# Patient Record
Sex: FEMALE | Race: WHITE | Employment: FULL TIME | URBAN - METROPOLITAN AREA
[De-identification: names, ages, dates, MRNs, and addresses within clinical notes are randomized per-mention and may not be internally consistent; named-entity substitution may affect disease eponyms.]

---

## 2018-02-16 ENCOUNTER — INITIAL CONSULT (OUTPATIENT)
Dept: PULMONOLOGY | Age: 58
End: 2018-02-16

## 2018-02-16 VITALS
BODY MASS INDEX: 31.35 KG/M2 | OXYGEN SATURATION: 97 % | RESPIRATION RATE: 18 BRPM | SYSTOLIC BLOOD PRESSURE: 122 MMHG | HEIGHT: 70 IN | DIASTOLIC BLOOD PRESSURE: 80 MMHG | WEIGHT: 219 LBS | HEART RATE: 80 BPM

## 2018-02-16 DIAGNOSIS — R05.3 CHRONIC COUGH: Primary | ICD-10-CM

## 2018-02-16 DIAGNOSIS — G47.19 EXCESSIVE DAYTIME SLEEPINESS: ICD-10-CM

## 2018-02-16 DIAGNOSIS — J45.991 COUGH VARIANT ASTHMA: ICD-10-CM

## 2018-02-16 DIAGNOSIS — R06.83 SNORING: ICD-10-CM

## 2018-02-16 LAB
DLCO %PRED: NORMAL
DLCO PRE: NORMAL
FEF 25-75%-POST: 3.18
FEF 25-75%-PRE: 4.39
FEV1-POST: 2.48
FEV1-PRE: 2.45
FEV1/FVC-POST: 90.7
FEV1/FVC-PRE: 90
FVC-POST: 2.74
FVC-PRE: 2.72
MEP: NORMAL
MIP: NORMAL
TLC %PRED: NORMAL
TLC PRE: NORMAL

## 2018-02-16 PROCEDURE — 99204 OFFICE O/P NEW MOD 45 MIN: CPT | Performed by: INTERNAL MEDICINE

## 2018-02-16 RX ORDER — OMEPRAZOLE 40 MG/1
40 CAPSULE, DELAYED RELEASE ORAL DAILY
COMMUNITY

## 2018-02-16 RX ORDER — TRAZODONE HYDROCHLORIDE 100 MG/1
100 TABLET ORAL NIGHTLY
COMMUNITY

## 2018-02-16 RX ORDER — ALBUTEROL SULFATE 90 UG/1
2 AEROSOL, METERED RESPIRATORY (INHALATION) EVERY 6 HOURS PRN
COMMUNITY
End: 2021-10-18 | Stop reason: SDUPTHER

## 2018-02-16 RX ORDER — MONTELUKAST SODIUM 10 MG/1
10 TABLET ORAL NIGHTLY
COMMUNITY

## 2018-02-16 RX ORDER — CHOLESTYRAMINE
POWDER (GRAM) MISCELLANEOUS
COMMUNITY

## 2018-02-16 ASSESSMENT — PULMONARY FUNCTION TESTS
FEV1/FVC_POST: 90.7
FEV1_POST: 2.48
FVC_POST: 2.74
FEV1_PRE: 2.45
FEV1/FVC_PRE: 90.0
FVC_PRE: 2.72

## 2018-02-16 NOTE — PROGRESS NOTES
Subjective:   Chief complaintchronic cough  Nigel Lawrence is a 73-year-old female states that she's had a chronic cough for over 5 years. The etiology is not been found but she states that treatment with an albuterol rescue inhaler has been helpful. She coughs day and night and at times her cough is productive of a small amount of sputum. In the late fall she coughed so hard that she had severe pain in her right lateral chest wall which was later confirmed by CT chest showing healed rib fracture in the fourth and fifth right rib. She has never been diagnosed with chronic lung disease in the past and denies a history of asthma as a child or adolescent and there is no family history of asthma or COPD. She is always been a nonsmoker. She states that it at least 5 years ago she was diagnosed with airborne allergen sensitivity and was sensitive to trees, grasses, dust, molds, cat and dog dander. She did receive immunotherapy for several years. She does have a history of GERD and takes omeprazole on a daily basis but states that she has no breakthrough symptoms. She does not have a strong history for seasonal or perennial rhinitis. She also does not have a history of hypertension or exposure to ACE inhibitors  She does state that over the past year she's noted increasing shortness of breath with some chest congestion. At times she will hear herself wheeze. She has not been on controlling inhaled medications or inhaled steroids. He does take Singulair on a daily basis. She also states that over the past 5-10 years she is had very heavy snoring, awakens at nighttime starting gasping for air and does complain of excessive daytime sleepiness.   She is never undergone a workup for obstructive sleep apnea  Past Medical History:  Past Medical History:   Diagnosis Date    Chronic cough 2/16/2018    Cough variant asthma 2/16/2018    Excessive daytime sleepiness 2/16/2018    Snoring 2/16/2018       Current Medications:

## 2018-02-19 DIAGNOSIS — J45.991 COUGH VARIANT ASTHMA: ICD-10-CM

## 2018-02-19 DIAGNOSIS — R05.3 CHRONIC COUGH: ICD-10-CM

## 2019-11-12 ENCOUNTER — OFFICE VISIT (OUTPATIENT)
Dept: PHYSICAL MEDICINE AND REHAB | Age: 59
End: 2019-11-12
Payer: COMMERCIAL

## 2019-11-12 DIAGNOSIS — M79.601 PARESTHESIA AND PAIN OF BOTH UPPER EXTREMITIES: ICD-10-CM

## 2019-11-12 DIAGNOSIS — G56.02 CARPAL TUNNEL SYNDROME OF LEFT WRIST: Primary | ICD-10-CM

## 2019-11-12 DIAGNOSIS — M79.602 PARESTHESIA AND PAIN OF BOTH UPPER EXTREMITIES: ICD-10-CM

## 2019-11-12 DIAGNOSIS — M25.522 ELBOW PAIN, CHRONIC, LEFT: ICD-10-CM

## 2019-11-12 DIAGNOSIS — R20.2 PARESTHESIA AND PAIN OF BOTH UPPER EXTREMITIES: ICD-10-CM

## 2019-11-12 DIAGNOSIS — G89.29 ELBOW PAIN, CHRONIC, LEFT: ICD-10-CM

## 2019-11-12 PROCEDURE — 95911 NRV CNDJ TEST 9-10 STUDIES: CPT | Performed by: PHYSICAL MEDICINE & REHABILITATION

## 2019-11-12 PROCEDURE — 95885 MUSC TST DONE W/NERV TST LIM: CPT | Performed by: PHYSICAL MEDICINE & REHABILITATION

## 2021-01-30 ENCOUNTER — HOSPITAL ENCOUNTER (OUTPATIENT)
Dept: GENERAL RADIOLOGY | Age: 61
Discharge: HOME | End: 2021-01-30
Payer: COMMERCIAL

## 2021-01-30 ENCOUNTER — HOSPITAL ENCOUNTER (OUTPATIENT)
Age: 61
Discharge: HOME | End: 2021-01-30
Payer: COMMERCIAL

## 2021-01-30 DIAGNOSIS — R06.2 WHEEZING: ICD-10-CM

## 2021-01-30 DIAGNOSIS — R05.9 COUGH: ICD-10-CM

## 2021-01-30 PROCEDURE — 71046 X-RAY EXAM CHEST 2 VIEWS: CPT

## 2021-03-15 ENCOUNTER — HOSPITAL ENCOUNTER (OUTPATIENT)
Dept: CT IMAGING | Age: 61
Discharge: HOME | End: 2021-03-15
Payer: COMMERCIAL

## 2021-03-15 DIAGNOSIS — K57.81 DIVERTICULITIS OF INTESTINE, PART UNSPECIFIED, WITH PERFORATION AND ABSCESS WITH BLEEDING: ICD-10-CM

## 2021-03-15 DIAGNOSIS — R10.32 LLQ ABDOMINAL PAIN: ICD-10-CM

## 2021-03-15 LAB
GFR AFRICAN AMERICAN: >60 ML/MIN/1.73M2
GFR NON-AFRICAN AMERICAN: >60 ML/MIN/1.73M2
POC CREATININE: 0.7 MG/DL (ref 0.6–1.1)

## 2021-03-15 PROCEDURE — 2580000003 HC RX 258: Performed by: INTERNAL MEDICINE

## 2021-03-15 PROCEDURE — 6360000004 HC RX CONTRAST MEDICATION: Performed by: INTERNAL MEDICINE

## 2021-03-15 PROCEDURE — 74177 CT ABD & PELVIS W/CONTRAST: CPT

## 2021-03-15 RX ORDER — SODIUM CHLORIDE 0.9 % (FLUSH) 0.9 %
10 SYRINGE (ML) INJECTION PRN
Status: DISCONTINUED | OUTPATIENT
Start: 2021-03-15 | End: 2021-03-16 | Stop reason: HOSPADM

## 2021-03-15 RX ADMIN — SODIUM CHLORIDE, PRESERVATIVE FREE 10 ML: 5 INJECTION INTRAVENOUS at 14:15

## 2021-03-15 RX ADMIN — IOPAMIDOL 75 ML: 755 INJECTION, SOLUTION INTRAVENOUS at 14:15

## 2021-03-15 RX ADMIN — IOHEXOL 50 ML: 240 INJECTION, SOLUTION INTRATHECAL; INTRAVASCULAR; INTRAVENOUS; ORAL at 13:10

## 2021-03-25 ENCOUNTER — INITIAL CONSULT (OUTPATIENT)
Dept: PULMONOLOGY | Age: 61
End: 2021-03-25
Payer: COMMERCIAL

## 2021-03-25 VITALS
HEIGHT: 70 IN | DIASTOLIC BLOOD PRESSURE: 74 MMHG | SYSTOLIC BLOOD PRESSURE: 120 MMHG | OXYGEN SATURATION: 96 % | BODY MASS INDEX: 30.78 KG/M2 | WEIGHT: 215 LBS | HEART RATE: 89 BPM

## 2021-03-25 DIAGNOSIS — J45.30 MILD PERSISTENT ASTHMA WITHOUT COMPLICATION: Primary | ICD-10-CM

## 2021-03-25 DIAGNOSIS — J45.991 COUGH VARIANT ASTHMA: ICD-10-CM

## 2021-03-25 DIAGNOSIS — R06.83 SNORING: ICD-10-CM

## 2021-03-25 PROCEDURE — 99213 OFFICE O/P EST LOW 20 MIN: CPT | Performed by: INTERNAL MEDICINE

## 2021-03-25 PROCEDURE — 3017F COLORECTAL CA SCREEN DOC REV: CPT | Performed by: INTERNAL MEDICINE

## 2021-03-25 PROCEDURE — 1036F TOBACCO NON-USER: CPT | Performed by: INTERNAL MEDICINE

## 2021-03-25 PROCEDURE — G8484 FLU IMMUNIZE NO ADMIN: HCPCS | Performed by: INTERNAL MEDICINE

## 2021-03-25 PROCEDURE — G8417 CALC BMI ABV UP PARAM F/U: HCPCS | Performed by: INTERNAL MEDICINE

## 2021-03-25 PROCEDURE — G8427 DOCREV CUR MEDS BY ELIG CLIN: HCPCS | Performed by: INTERNAL MEDICINE

## 2021-03-25 RX ORDER — FLUTICASONE FUROATE AND VILANTEROL TRIFENATATE 100; 25 UG/1; UG/1
1 POWDER RESPIRATORY (INHALATION) DAILY
Qty: 1 EACH | Refills: 11 | Status: SHIPPED | OUTPATIENT
Start: 2021-03-25

## 2021-03-25 NOTE — PROGRESS NOTES
basilar atelectasis. Chest x-ray on 1/30/2021 showed no acute cardiopulmonary abnormality  PFT: Office spirometry on 2/16/2018 demonstrated no significant airways obstruction and no significant response to bronchodilators      Echocardiogram: No echo available    ASSESSMENT:    1. Mild persistent asthma without complication    2. Cough variant asthma    3. Snoring          PLAN:   I do believe she has mild persistent asthmastage II. I would like to start her on Breo 100/25 1 inhalation daily and allow her to use her Singulair and albuterol rescue as needed. I would like to repeat her pulmonary function test in 1 month. I did encourage her to get the COVID-19 vaccination as soon as possible. I will continue to follow her    We have discussed the need to maintain yearly flu immunization, pneumococcal vaccination. We have discussed Coronavirus precaution including handwashing practice, wiping items touched in public such as gas pumps, door handles, shopping carts, etc. Self monitoring for infection - fever, chills, cough, SOB. Should they develop symptoms they should call office for further instructions. Return in about 4 weeks (around 4/22/2021) for Recheck for Asthma, Recheck for Cough, PFT testing. This dictation was performed with a verbal recognition program and it was checked for errors. It is possible that there are still dictated errors within this office note. Any errors should be brought immediately to my attention for correction. All efforts were made to ensure that this office note is accurate.

## 2021-04-22 ENCOUNTER — PROCEDURE VISIT (OUTPATIENT)
Dept: PULMONOLOGY | Age: 61
End: 2021-04-22
Payer: COMMERCIAL

## 2021-04-22 DIAGNOSIS — J45.991 COUGH VARIANT ASTHMA: ICD-10-CM

## 2021-04-22 DIAGNOSIS — J45.30 MILD PERSISTENT ASTHMA WITHOUT COMPLICATION: Primary | ICD-10-CM

## 2021-04-22 DIAGNOSIS — J45.30 MILD PERSISTENT ASTHMA WITHOUT COMPLICATION: ICD-10-CM

## 2021-04-22 LAB
EXPIRATORY TIME-POST: NORMAL
EXPIRATORY TIME: NORMAL
FEF 25-75% %CHNG: NORMAL
FEF 25-75% %PRED-POST: NORMAL
FEF 25-75% %PRED-PRE: NORMAL
FEF 25-75% PRED: NORMAL
FEF 25-75%-POST: NORMAL
FEF 25-75%-PRE: NORMAL
FEV1 %PRED-POST: 72.8 %
FEV1 %PRED-PRE: 74.7 %
FEV1 PRED: 3.2 L
FEV1-POST: 2.33 L
FEV1-PRE: 2.39 L
FEV1/FVC %PRED-POST: 110.7 %
FEV1/FVC %PRED-PRE: 113.1 %
FEV1/FVC PRED: 78.4 %
FEV1/FVC-POST: 86.8 %
FEV1/FVC-PRE: 88.7 %
FVC %PRED-POST: 65.2 L
FVC %PRED-PRE: 65.4 %
FVC PRED: 4.13 L
FVC-POST: 2.69 L
FVC-PRE: 2.7 L
PEF %PRED-POST: NORMAL
PEF %PRED-PRE: NORMAL
PEF PRED: NORMAL
PEF%CHNG: NORMAL
PEF-POST: NORMAL
PEF-PRE: NORMAL

## 2021-04-22 PROCEDURE — G8417 CALC BMI ABV UP PARAM F/U: HCPCS | Performed by: INTERNAL MEDICINE

## 2021-04-22 PROCEDURE — 1036F TOBACCO NON-USER: CPT | Performed by: INTERNAL MEDICINE

## 2021-04-22 PROCEDURE — G8427 DOCREV CUR MEDS BY ELIG CLIN: HCPCS | Performed by: INTERNAL MEDICINE

## 2021-04-22 PROCEDURE — 3017F COLORECTAL CA SCREEN DOC REV: CPT | Performed by: INTERNAL MEDICINE

## 2021-04-22 PROCEDURE — 99213 OFFICE O/P EST LOW 20 MIN: CPT | Performed by: INTERNAL MEDICINE

## 2021-04-22 ASSESSMENT — PULMONARY FUNCTION TESTS
FEV1_PERCENT_PREDICTED_POST: 72.8
FEV1/FVC_PERCENT_PREDICTED_POST: 110.7
FEV1/FVC_POST: 86.8
FEV1_POST: 2.33
FEV1_PREDICTED: 3.20
FEV1_PRE: 2.39
FEV1/FVC_PRE: 88.7
FVC_POST: 2.69
FVC_PERCENT_PREDICTED_POST: 65.2
FVC_PERCENT_PREDICTED_PRE: 65.4
FEV1/FVC_PREDICTED: 78.4
FVC_PRE: 2.70
FEV1/FVC_PERCENT_PREDICTED_PRE: 113.1
FEV1_PERCENT_PREDICTED_PRE: 74.7
FVC_PREDICTED: 4.13

## 2021-04-22 NOTE — PROGRESS NOTES
CHIEF COMPLIANT:  Ely Pryor presents to the pulmonary clinic today for evaluation, spirometry testing, review of testing results and pulmonary medications. She major complaint today is mild persistent asthma, cough variant asthma    HPI: Jim Hicks states that since being started back on the Breo her cough is improved and she is not having any major asthmatic symptoms. She denies worsening shortness of breath. She does continue on Breo, Singulair and albuterol rescue. She has had no known COVID-19 exposure or infection and has received the [employer] COVID-19 vaccination    Physical Exam:  Constitutional:  She appears well developed and well-nourished. Respiratory: No acute respiratory distress noted  Neurologic: Oriented x3, normal speech    OFFICE SPIROMETRY:  Ely Pryor demonstrates an FEV1 of 2.39 L with an FVC of 2.70 liters. She demonstrates no signal obstructive lung defect. She shows no signal response to bronchodilators. Overall, her lung function has remained stable over the 3 past years. ASSESSMENT:    1. Mild persistent asthma without complication    2. Cough variant asthma          PLAN:   I will make no change in her current bronchodilator therapy. I did encourage use her albuterol rescue inhaler if she develops any intermittent wheezing or worsening cough. I will continue to follow her      We have discussed the need to maintain yearly flu immunization, pneumococcal vaccination and coronavirus vaccination. We have discussed Coronavirus precaution including handwashing practice, wiping items touched in public such as gas pumps, door handles, shopping carts, etc. Self monitoring for infection - fever, chills, cough, SOB. Should they develop symptoms they should call office for further instructions. Return in about 6 months (around 10/22/2021) for Recheck for Asthma, Recheck for Cough.       This dictation was performed with a verbal recognition program and it was checked for errors. It is possible that there are still dictated errors within this office note. Any errors should be brought immediately to my attention for correction. All efforts were made to ensure that this office note is accurate.

## 2021-10-18 ENCOUNTER — OFFICE VISIT (OUTPATIENT)
Dept: PULMONOLOGY | Age: 61
End: 2021-10-18
Payer: COMMERCIAL

## 2021-10-18 VITALS
SYSTOLIC BLOOD PRESSURE: 106 MMHG | HEART RATE: 74 BPM | HEIGHT: 70 IN | DIASTOLIC BLOOD PRESSURE: 62 MMHG | BODY MASS INDEX: 30.06 KG/M2 | OXYGEN SATURATION: 96 % | WEIGHT: 210 LBS

## 2021-10-18 DIAGNOSIS — R05.3 CHRONIC COUGH: ICD-10-CM

## 2021-10-18 DIAGNOSIS — J45.30 MILD PERSISTENT ASTHMA WITHOUT COMPLICATION: Primary | ICD-10-CM

## 2021-10-18 PROCEDURE — G8417 CALC BMI ABV UP PARAM F/U: HCPCS | Performed by: INTERNAL MEDICINE

## 2021-10-18 PROCEDURE — G8484 FLU IMMUNIZE NO ADMIN: HCPCS | Performed by: INTERNAL MEDICINE

## 2021-10-18 PROCEDURE — 1036F TOBACCO NON-USER: CPT | Performed by: INTERNAL MEDICINE

## 2021-10-18 PROCEDURE — 3017F COLORECTAL CA SCREEN DOC REV: CPT | Performed by: INTERNAL MEDICINE

## 2021-10-18 PROCEDURE — G8427 DOCREV CUR MEDS BY ELIG CLIN: HCPCS | Performed by: INTERNAL MEDICINE

## 2021-10-18 PROCEDURE — 99213 OFFICE O/P EST LOW 20 MIN: CPT | Performed by: INTERNAL MEDICINE

## 2021-10-18 RX ORDER — ALBUTEROL SULFATE 90 UG/1
2 AEROSOL, METERED RESPIRATORY (INHALATION) EVERY 6 HOURS PRN
Qty: 1 EACH | Refills: 11 | Status: SHIPPED | OUTPATIENT
Start: 2021-10-18

## 2021-10-18 NOTE — PROGRESS NOTES
SUBJECTIVE:  Chief Complaint: Mild persistent asthma, history of cough variant asthma with chronic cough  Tommy Christianson states that she had one episode of asthmatic bronchitis late in the summer and did go to urgent care and was given antibiotics and oral steroids and did improve and recovered from that. She continues on Breo daily and Singulair along with albuterol rescue. She denies any chest pain or chest discomfort. Soon after seeing her in March 2021 she did get the [employer] COVID-19 vaccination but has not received a booster yet. She mentions that she recently got the influenza vaccine  She states that she periodically gets hoarseness and did have some associated with her bronchitis this summer but has not had it recently. I am not aware of her having thrush    ROS:  Constitution:  HEENT: Negative for ear, throat pain  Cardiovascular: Negative for chest pain, syncope, edema  Pulmonary: See HPI  Musculoskeletal: Negative for DVT, myalgias, arthralgias    OBJECTIVE:  /62   Pulse 74   Ht 5' 10\" (1.778 m)   Wt 210 lb (95.3 kg)   SpO2 96%   BMI 30.13 kg/m²      Physical Exam:  Constitutional:  She appears well developed and well-nourished. Neck:  Supple, No palpable lymphadenopathy, No JVD  Cardiovascular:  S1, S2 Normal, Regular rhythm, no murmurs or gallops, No pericardial  rubs. Pulmonary: Clear breath sounds throughout all lung areas without wheezing or rhonchi  Abdomen: Not examined  Extremities: no edema, No DVT  Neurologic: Oriented x3, No focal deficits    Radiology: Chest x-ray on 1/30/2021 showed no acute cardiopulmonary abnormality  PFT: Office spirometry on 4/22/2021 demonstrated no significant airways obstruction and no significant response to bronchodilators and overall her lung function had remained stable over the previous 3 years          ASSESSMENT:    1. Mild persistent asthma without complication    2.  Chronic cough          PLAN:   I will make no change in her current bronchodilator therapy and she seems to be tolerating Breo quite well. I did renew her Ventolin rescue inhaler order. I did recommend she get the booster COVID-19 vaccination when it becomes available to her. I will continue to follow her    We have discussed the need to maintain yearly flu immunization, pneumococcal vaccination. We have discussed Coronavirus precaution including handwashing practice, wiping items touched in public such as gas pumps, door handles, shopping carts, etc. Self monitoring for infection - fever, chills, cough, SOB. Should they develop symptoms they should call office for further instructions. Return in about 6 months (around 4/18/2022) for Recheck for Asthma, Recheck for Cough. This dictation was performed with a verbal recognition program and it was checked for errors. It is possible that there are still dictated errors within this office note. Any errors should be brought immediately to my attention for correction. All efforts were made to ensure that this office note is accurate.

## 2022-05-18 ENCOUNTER — TELEPHONE (OUTPATIENT)
Dept: GASTROENTEROLOGY | Age: 62
End: 2022-05-18

## 2022-06-10 ENCOUNTER — TELEPHONE (OUTPATIENT)
Dept: GASTROENTEROLOGY | Age: 62
End: 2022-06-10

## 2022-06-13 ENCOUNTER — TELEPHONE (OUTPATIENT)
Dept: GASTROENTEROLOGY | Age: 62
End: 2022-06-13

## 2022-06-13 NOTE — TELEPHONE ENCOUNTER
[id number]  [birth year] mother of patient called to cancel daughter's procedure 7/7/22. Patient has to work that day and needs to cancel. Patient will call back to reschedule in December.   Sent to Rima Regalado in [place]

## 2022-06-23 ENCOUNTER — TELEPHONE (OUTPATIENT)
Dept: GASTROENTEROLOGY | Age: 62
End: 2022-06-23

## 2022-08-30 ENCOUNTER — TELEPHONE (OUTPATIENT)
Dept: GASTROENTEROLOGY | Age: 62
End: 2022-08-30

## 2022-11-07 ENCOUNTER — PREP FOR PROCEDURE (OUTPATIENT)
Dept: GASTROENTEROLOGY | Age: 62
End: 2022-11-07

## 2022-11-07 RX ORDER — SODIUM CHLORIDE 9 MG/ML
25 INJECTION, SOLUTION INTRAVENOUS PRN
Status: CANCELLED | OUTPATIENT
Start: 2022-11-07

## 2022-11-07 RX ORDER — SODIUM CHLORIDE, SODIUM LACTATE, POTASSIUM CHLORIDE, CALCIUM CHLORIDE 600; 310; 30; 20 MG/100ML; MG/100ML; MG/100ML; MG/100ML
INJECTION, SOLUTION INTRAVENOUS CONTINUOUS
Status: CANCELLED | OUTPATIENT
Start: 2022-11-07

## 2022-11-07 RX ORDER — SODIUM CHLORIDE 0.9 % (FLUSH) 0.9 %
5-40 SYRINGE (ML) INJECTION PRN
Status: CANCELLED | OUTPATIENT
Start: 2022-11-07

## 2022-11-07 RX ORDER — SODIUM CHLORIDE 0.9 % (FLUSH) 0.9 %
5-40 SYRINGE (ML) INJECTION EVERY 12 HOURS SCHEDULED
Status: CANCELLED | OUTPATIENT
Start: 2022-11-07

## 2022-11-30 ENCOUNTER — TELEPHONE (OUTPATIENT)
Dept: GASTROENTEROLOGY | Age: 62
End: 2022-11-30

## 2022-11-30 NOTE — TELEPHONE ENCOUNTER
Per my call to "[employer] [address] at [facility] DOUG-C-scope approved to 3/27/23.  Auth# [id number]

## 2022-12-02 ENCOUNTER — TELEPHONE (OUTPATIENT)
Dept: GASTROENTEROLOGY | Age: 62
End: 2022-12-02

## 2022-12-02 NOTE — TELEPHONE ENCOUNTER
Per my call to Cruz Woo at [facility]; C-scope is approved. Auth# [id number]. Call # [phone].

## 2022-12-27 RX ORDER — MEDROXYPROGESTERONE ACETATE 2.5 MG/1
2.5 TABLET ORAL DAILY
COMMUNITY

## 2022-12-27 NOTE — PROGRESS NOTES
Patient will be called with an arrival time on 12/29/2022 for her procedure at [facility] on 12/30/2022. 1. Do not eat or drink anything after 12 midnight (or____hours) unless instructed by your doctor prior to surgery. This includes no water, chewing gum or mints. NO chewing tobacco.  2. Follow your directions as prescribed by the doctor for your procedure and medications. 3.Check with your Doctor regarding stopping Plavix, Coumadin, Lovenox,Effient,Pradaxa,Xarelto, Fragmin or other blood thinners and follow their instructions. 4. Do not smoke, and do not drink any alcoholic beverages 24 hours prior to surgery. This includes NA Beer. 5. You may brush your teeth and gargle the morning of surgery. DO NOT SWALLOW WATER  6. You MUST make arrangements for a responsible adult to take you home after your surgery and be able to check on you every couple hours for the day. You will not be allowed     to leave alone or drive yourself home. It is strongly suggested someone stay with you the first 24 hrs. Your surgery will be cancelled if you do not have a ride home. 7. Please wear simple, loose fitting clothing to the hospital.  Belinda Mccormick not bring valuables (money, credit cards, checkbooks, etc.) Do not wear any makeup (including no eye makeup) or nail polish on your fingers or toes. 8. DO NOT wear any jewelry or piercings on day of surgery. All body piercing jewelry must be removed  9. If you have dentures, they will be removed before going to the OR; we will provide you a container. If you wear contact lenses or glasses, they will be removed; please bring a case for them. 10. If you  have a Living Will and Durable Power of  for Healthcare, please bring in a copy. 11 Please bring picture ID,  insurance card, paperwork from the doctors office    (H & P, Consent, & card for implantable devices). Patient will use her ellipta inhaler the morning of her procedure and take her prilosec, she will bring her rescue inhaler with her that day. She had no questions concerning colon prep instructions at this time.

## 2022-12-28 ENCOUNTER — HOSPITAL ENCOUNTER (OUTPATIENT)
Dept: NUCLEAR MEDICINE | Age: 62
Discharge: HOME | End: 2022-12-28
Payer: COMMERCIAL

## 2022-12-28 DIAGNOSIS — Z96.652 PRESENCE OF LEFT ARTIFICIAL KNEE JOINT: ICD-10-CM

## 2022-12-28 DIAGNOSIS — M25.562 LEFT KNEE PAIN, UNSPECIFIED CHRONICITY: ICD-10-CM

## 2022-12-28 PROCEDURE — A9503 TC99M MEDRONATE: HCPCS | Performed by: ORTHOPAEDIC SURGERY

## 2022-12-28 PROCEDURE — 3430000000 HC RX DIAGNOSTIC RADIOPHARMACEUTICAL: Performed by: ORTHOPAEDIC SURGERY

## 2022-12-28 PROCEDURE — 78315 BONE IMAGING 3 PHASE: CPT

## 2022-12-28 RX ORDER — TC 99M MEDRONATE 20 MG/10ML
27.4 INJECTION, POWDER, LYOPHILIZED, FOR SOLUTION INTRAVENOUS
Status: COMPLETED | OUTPATIENT
Start: 2022-12-28 | End: 2022-12-28

## 2022-12-28 RX ADMIN — TC 99M MEDRONATE 27.4 MILLICURIE: 20 INJECTION, POWDER, LYOPHILIZED, FOR SOLUTION INTRAVENOUS at 08:29

## 2022-12-29 ENCOUNTER — ANESTHESIA EVENT (OUTPATIENT)
Dept: OPERATING ROOM | Age: 62
End: 2022-12-29
Payer: COMMERCIAL

## 2022-12-29 NOTE — ANESTHESIA PRE PROCEDURE
Department of Anesthesiology  Preprocedure Note       Name:  Berta Palafox   Age:  63 y.o.  :  1960                                          MRN:  [id number]         Date:  2022      Surgeon: Ana Luisa Harris):  Melani Landrum MD    Procedure: Procedure(s):  COLORECTAL CANCER SCREENING, NOT HIGH RISK    Medications prior to admission:   Prior to Admission medications    Medication Sig Start Date End Date Taking? Authorizing Provider   medroxyPROGESTERone (PROVERA) 2.5 MG tablet Take 2.5 mg by mouth daily   Yes Historical Provider, MD   albuterol sulfate HFA (VENTOLIN HFA) 108 (90 Base) MCG/ACT inhaler Inhale 2 puffs into the lungs every 6 hours as needed for Wheezing 10/18/21   Edison Chong MD   fluticasone-vilanterol (BREO ELLIPTA) 100-25 MCG/INH AEPB inhaler Inhale 1 puff into the lungs daily After inhalation then gargle 3/25/21   Edison Chong MD   Cholestyramine POWD by Does not apply route    Historical Provider, MD   montelukast (SINGULAIR) 10 MG tablet Take 10 mg by mouth nightly    Historical Provider, MD   traZODone (DESYREL) 100 MG tablet Take 100 mg by mouth nightly    Historical Provider, MD   omeprazole (PRILOSEC) 40 MG delayed release capsule Take 40 mg by mouth daily    Historical Provider, MD   sertraline (ZOLOFT) 100 MG tablet Take 100 mg by mouth daily. Historical Provider, MD   estradiol (ESTRACE) 1 MG tablet Take 1 mg by mouth daily. Historical Provider, MD   simvastatin (ZOCOR) 40 MG tablet Take 40 mg by mouth nightly. Historical Provider, MD   aspirin 81 MG chewable tablet Take 81 mg by mouth daily. Historical Provider, MD       Current medications:    No current facility-administered medications for this encounter.      Current Outpatient Medications   Medication Sig Dispense Refill    medroxyPROGESTERone (PROVERA) 2.5 MG tablet Take 2.5 mg by mouth daily      albuterol sulfate HFA (VENTOLIN HFA) 108 (90 Base) MCG/ACT inhaler Inhale 2 puffs into the lungs every 6 hours as needed for Wheezing 1 each 11    fluticasone-vilanterol (BREO ELLIPTA) 100-25 MCG/INH AEPB inhaler Inhale 1 puff into the lungs daily After inhalation then gargle 1 each 11    Cholestyramine POWD by Does not apply route      montelukast (SINGULAIR) 10 MG tablet Take 10 mg by mouth nightly      traZODone (DESYREL) 100 MG tablet Take 100 mg by mouth nightly      omeprazole (PRILOSEC) 40 MG delayed release capsule Take 40 mg by mouth daily      sertraline (ZOLOFT) 100 MG tablet Take 100 mg by mouth daily.  estradiol (ESTRACE) 1 MG tablet Take 1 mg by mouth daily.  simvastatin (ZOCOR) 40 MG tablet Take 40 mg by mouth nightly.  aspirin 81 MG chewable tablet Take 81 mg by mouth daily. Allergies: Allergies   Allergen Reactions    Sulfa Antibiotics        Problem List:    Patient Active Problem List   Diagnosis Code    Fibrocystic breast changes, bilateral N60.11, N60.12    Seborrheic keratosis, inflamed L82.0    Chronic cough R05.3    Snoring R06.83    Excessive daytime sleepiness G47.19    Cough variant asthma J45.991    Mild persistent asthma J45.30       Past Medical History:        Diagnosis Date    Chronic cough 02/16/2018    Cough variant asthma 02/16/2018    Excessive daytime sleepiness 02/16/2018    patient states \" she does not have this.  Mild persistent asthma 03/25/2021    Snoring 02/16/2018       Past Surgical History:        Procedure Laterality Date    COLONOSCOPY      FINGER SURGERY Right 01/01/1993    RING    KNEE ARTHROPLASTY Bilateral     KNEE ARTHROSCOPY Bilateral     SHOULDER SURGERY Left        Social History:    Social History     Tobacco Use    Smoking status: Never    Smokeless tobacco: Never   Substance Use Topics    Alcohol use:  No                                Counseling given: Not Answered      Vital Signs (Current):   Vitals:    12/27/22 1331   Weight: 200 lb (90.7 kg)   Height: 5' 10\" (1.778 m) BP Readings from Last 3 Encounters:   10/18/21 106/62   03/25/21 120/74   02/16/18 122/80       NPO Status:                                                                                 BMI:   Wt Readings from Last 3 Encounters:   10/18/21 210 lb (95.3 kg)   03/25/21 215 lb (97.5 kg)   02/16/18 219 lb (99.3 kg)     Body mass index is 28.7 kg/m². CBC:   Lab Results   Component Value Date/Time    WBC 7.0 11/17/2013 06:00 AM    RBC 3.35 11/17/2013 06:00 AM    HGB 10.7 11/17/2013 06:00 AM    HCT 30.7 11/17/2013 06:00 AM    MCV 91.7 11/17/2013 06:00 AM    RDW 14.5 11/17/2013 06:00 AM     11/17/2013 06:00 AM       CMP:   Lab Results   Component Value Date/Time     11/16/2013 02:50 AM    K 3.6 11/16/2013 02:50 AM     11/16/2013 02:50 AM    CO2 31 11/16/2013 02:50 AM    BUN <5 11/16/2013 02:50 AM    CREATININE 0.7 03/15/2021 02:10 PM    CREATININE 0.6 11/16/2013 02:50 AM    GFRAA >60 03/15/2021 02:10 PM    LABGLOM >60 03/15/2021 02:10 PM    GLUCOSE 108 11/16/2013 02:50 AM    CALCIUM 8.6 11/16/2013 02:50 AM       POC Tests: No results for input(s): POCGLU, POCNA, POCK, POCCL, POCBUN, POCHEMO, POCHCT in the last 72 hours.     Coags: No results found for: PROTIME, INR, APTT    HCG (If Applicable): No results found for: PREGTESTUR, PREGSERUM, HCG, HCGQUANT     ABGs: No results found for: PHART, PO2ART, OKY9JVP, DLE1BZS, BEART, E0NSPIYG     Type & Screen (If Applicable):  No results found for: LABABO, LABRH    Drug/Infectious Status (If Applicable):  No results found for: HIV, HEPCAB    COVID-19 Screening (If Applicable): No results found for: COVID19        Anesthesia Evaluation    Airway: Mallampati: II  TM distance: <3 FB   Neck ROM: full  Mouth opening: > = 3 FB   Dental: normal exam   (+) upper braces and lower braces      Pulmonary:   (+) asthma:                            Cardiovascular:    (+) hypertension:,                   Neuro/Psych:   Negative Neuro/Psych ROS GI/Hepatic/Renal:   (+) bowel prep,           Endo/Other: Negative Endo/Other ROS                    Abdominal:             Vascular: negative vascular ROS. Other Findings:           Anesthesia Plan      MAC     ASA 2       Induction: intravenous. Anesthetic plan and risks discussed with patient. Plan discussed with attending.                     ANDRZEJ Hauser - CRNA   12/29/2022

## 2022-12-29 NOTE — PROGRESS NOTES
Spoke with patient's mother Bhumi Nation and patient will arrive at 0800 at [facility] on 12/30/2022 for her procedure at 0900.

## 2022-12-30 ENCOUNTER — ANESTHESIA (OUTPATIENT)
Dept: OPERATING ROOM | Age: 62
End: 2022-12-30
Payer: COMMERCIAL

## 2022-12-30 ENCOUNTER — HOSPITAL ENCOUNTER (OUTPATIENT)
Age: 62
Setting detail: OUTPATIENT SURGERY
Discharge: HOME | End: 2022-12-30
Attending: INTERNAL MEDICINE | Admitting: INTERNAL MEDICINE
Payer: COMMERCIAL

## 2022-12-30 VITALS
WEIGHT: 200 LBS | SYSTOLIC BLOOD PRESSURE: 110 MMHG | HEIGHT: 70 IN | BODY MASS INDEX: 28.63 KG/M2 | OXYGEN SATURATION: 95 % | TEMPERATURE: 98.8 F | HEART RATE: 72 BPM | RESPIRATION RATE: 16 BRPM | DIASTOLIC BLOOD PRESSURE: 60 MMHG

## 2022-12-30 DIAGNOSIS — Z12.11 COLON CANCER SCREENING: ICD-10-CM

## 2022-12-30 PROBLEM — K63.5 POLYP OF ASCENDING COLON: Status: ACTIVE | Noted: 2022-12-30

## 2022-12-30 PROCEDURE — 3609010600 HC COLONOSCOPY POLYPECTOMY SNARE/COLD BIOPSY: Performed by: INTERNAL MEDICINE

## 2022-12-30 PROCEDURE — 7100000011 HC PHASE II RECOVERY - ADDTL 15 MIN: Performed by: INTERNAL MEDICINE

## 2022-12-30 PROCEDURE — 6360000002 HC RX W HCPCS

## 2022-12-30 PROCEDURE — 45380 COLONOSCOPY AND BIOPSY: CPT | Performed by: INTERNAL MEDICINE

## 2022-12-30 PROCEDURE — 7100000010 HC PHASE II RECOVERY - FIRST 15 MIN: Performed by: INTERNAL MEDICINE

## 2022-12-30 PROCEDURE — 2709999900 HC NON-CHARGEABLE SUPPLY: Performed by: INTERNAL MEDICINE

## 2022-12-30 PROCEDURE — 3700000000 HC ANESTHESIA ATTENDED CARE: Performed by: INTERNAL MEDICINE

## 2022-12-30 PROCEDURE — 3700000001 HC ADD 15 MINUTES (ANESTHESIA): Performed by: INTERNAL MEDICINE

## 2022-12-30 PROCEDURE — 2580000003 HC RX 258: Performed by: NURSE PRACTITIONER

## 2022-12-30 PROCEDURE — 88305 TISSUE EXAM BY PATHOLOGIST: CPT | Performed by: PATHOLOGY

## 2022-12-30 RX ORDER — SODIUM CHLORIDE 9 MG/ML
25 INJECTION, SOLUTION INTRAVENOUS PRN
Status: DISCONTINUED | OUTPATIENT
Start: 2022-12-30 | End: 2022-12-30 | Stop reason: HOSPADM

## 2022-12-30 RX ORDER — LIDOCAINE HYDROCHLORIDE 20 MG/ML
INJECTION, SOLUTION INTRAVENOUS PRN
Status: DISCONTINUED | OUTPATIENT
Start: 2022-12-30 | End: 2022-12-30 | Stop reason: SDUPTHER

## 2022-12-30 RX ORDER — SODIUM CHLORIDE 0.9 % (FLUSH) 0.9 %
5-40 SYRINGE (ML) INJECTION EVERY 12 HOURS SCHEDULED
Status: DISCONTINUED | OUTPATIENT
Start: 2022-12-30 | End: 2022-12-30 | Stop reason: HOSPADM

## 2022-12-30 RX ORDER — SODIUM CHLORIDE, SODIUM LACTATE, POTASSIUM CHLORIDE, CALCIUM CHLORIDE 600; 310; 30; 20 MG/100ML; MG/100ML; MG/100ML; MG/100ML
INJECTION, SOLUTION INTRAVENOUS CONTINUOUS
Status: DISCONTINUED | OUTPATIENT
Start: 2022-12-30 | End: 2022-12-30 | Stop reason: HOSPADM

## 2022-12-30 RX ORDER — SODIUM CHLORIDE 0.9 % (FLUSH) 0.9 %
5-40 SYRINGE (ML) INJECTION PRN
Status: DISCONTINUED | OUTPATIENT
Start: 2022-12-30 | End: 2022-12-30 | Stop reason: HOSPADM

## 2022-12-30 RX ORDER — PROPOFOL 10 MG/ML
INJECTION, EMULSION INTRAVENOUS PRN
Status: DISCONTINUED | OUTPATIENT
Start: 2022-12-30 | End: 2022-12-30 | Stop reason: SDUPTHER

## 2022-12-30 RX ADMIN — LIDOCAINE HYDROCHLORIDE 100 MG: 20 INJECTION, SOLUTION INTRAVENOUS at 09:16

## 2022-12-30 RX ADMIN — PROPOFOL 320 MG: 10 INJECTION, EMULSION INTRAVENOUS at 09:16

## 2022-12-30 RX ADMIN — SODIUM CHLORIDE, POTASSIUM CHLORIDE, SODIUM LACTATE AND CALCIUM CHLORIDE: 600; 310; 30; 20 INJECTION, SOLUTION INTRAVENOUS at 09:13

## 2022-12-30 RX ADMIN — PHENYLEPHRINE HYDROCHLORIDE 100 MCG: 10 INJECTION INTRAVENOUS at 09:46

## 2022-12-30 ASSESSMENT — PAIN SCALES - GENERAL
PAINLEVEL_OUTOF10: 0

## 2022-12-30 NOTE — PROGRESS NOTES
1020:  Pt discharged to home alert and oriented with no c/o pain or discomfort. Pt tolerating PO, VSS. Pt discharged ambulatory.

## 2022-12-30 NOTE — DISCHARGE INSTRUCTIONS
COLONOSCOPY    _________________________________    OFFICE [id number]__[phone]______________________    FOLLOW UP APPOINTMENT call for results. REPEAT PROCEDURE IN ___5___YEARS OR AS NEEDED. TEST ORDERED: NONE______________________________________________________________________. What to expect at home: Your Recovery   Your doctor will tell you when you can eat and do your other usual activities Your doctor will talk to you about when you will need your next colonoscopy. Your doctor can help you decide how often you need to be checked. This will depend on the results of your test and your risk for colorectal cancer. After the test, you may be bloated or have gas pains. You may need to pass gas. If a biopsy was done or a polyp was removed, you may have streaks of blood in your stool (feces) for a few days. This care sheet gives you a general idea about how long it will take for you to recover. But each person recovers at a different pace. Follow the steps below to get better as quickly as possible. How can you care for yourself at home? Activity  Rest when you feel tired. Diet  Follow your doctor's directions for eating. Unless your doctor has told you not to, drink plenty of fluids. This helps to replace the fluids that were lost during the colon prep. DO NOT DRINK ALCOHOL. Medicines  Your doctor will tell you if and when you can restart your medicines. He or she will also give you instructions about taking any new medicines. If you take blood thinners, such as warfarin (Coumadin), clopidogrel (Plavix), or aspirin, be sure to talk to your doctor. He or she will tell you if and when to start taking those medicines again. Make sure that you understand exactly what your doctor wants you to do. If polyps were removed or a biopsy was done during the test, your doctor may tell you not to take aspirin or other anti-inflammatory medicines for a few days.  These include ibuprofen (Advil, Motrin) and naproxen (Aleve). Other instructions: Anethesia  For your safety, do not drive or operate machinery for 24 hours. Do not sign legal documents or make major decisions for 24 hours. The anesthesia can make it hard for you to fully understand what you are agreeing to. Follow-up care is a key part of your treatment and safety. Be sure to make and go to all appointments, and call your doctor if you are having problems. It's also a good idea to know your test results and keep a list of the medicines you take. When should you call for help? [facility] [place] [phone]  Call 911 anytime you think you may need emergency care. For example, call if:  You passed out (lost consciousness). You pass maroon or bloody stools. You have severe belly pain. Call your doctor now or seek immediate medical care if:  Your stools are black and tarlike. Your stools have streaks of blood, but you did not have a biopsy or any polyps removed. You have belly pain, or your belly is swollen and firm. You vomit. You have a fever. You are very dizzy. Watch closely for changes in your health, and be sure to contact your doctor if you have any problems. Where can you learn more? Go to [email or web address] and sign in to your [employer] account. Enter E264 in the [facility] box to learn more about Colonoscopy: What to Expect at Home.     If you do not have an account, please click on the Sign Up Now link. © 0883-3356 Healthwise, Incorporated. Care instructions adapted under license by [facility] "[employer] [facility] ([facility]). This care instruction is for use with your licensed healthcare professional. If you have questions about a medical condition or this instruction, always ask your healthcare professional. [address] any warranty or liability for your use of this information.   Content Version: 35.7.991501; Current as of: November 20, 2015             [place]    Do not drive, work around [facility] or use equipment. Do not drink any alcoholic beverages. Do not smoke while alone. Avoid making important decisions. Plan to spend a quiet, relaxed evening @ home. Resume normal activities as you begin to feel better. Eat lightly for your first meal, then gradually increase your diet to what is normal for you. In case of nausea, avoid food and drink only clear liquids. Resume food as nausea ceases. Notify your surgeon if you experience fever, chills, large amount of bleeding, difficulty breathing, persistent nausea and vomiting or any other disturbing problem. Call for a follow-up appointment with your surgeon.

## 2022-12-30 NOTE — BRIEF OP NOTE
Brief Postoperative Note      Patient: Radha Disla  YOB: 1960  MRN: [id number]    Date of Procedure: 12/30/2022    Pre-Op Diagnosis: Colon cancer screening [Z12.11]    Post-Op Diagnosis: Diminutive polyp in ascending colon removed with cold biopsy forceps, sigmoid diverticulosis, internal hemorrhoids. Procedure(s):  COLONOSCOPY POLYPECTOMY SNARE/COLD BIOPSY    Surgeon(s):  Tamiko Maki MD    Assistant:  * No surgical staff found *    Anesthesia: Monitor Anesthesia Care    Estimated Blood Loss (mL): Minimal    Complications: None    Specimens:   ID Type Source Tests Collected by Time Destination   A : FORCEPS Tissue Colon SURGICAL PATHOLOGY Tamiko Maki MD 12/30/2022 9297        Implants:  * No implants in log *      Drains: * No LDAs found *    Findings: As above, repeat colon 5-10 years pending pathology.      Electronically signed by Tamiko Maki MD on 12/30/2022 at 9:35 AM

## 2022-12-30 NOTE — PROGRESS NOTES
0845: PT ALERT AND ORIENTED X 4. MOM AT BEDSIDE. PRE-OP QUESTIONS ASKED AND ANSWERED APPROPRIATELY BY PT. NAME, , ARMBAND VERIFIED, PROCEDURE, ALLERGIES, IMPLANTS, PREP STATUS, LAST PO INTAKE, OB STATUS, HISTORY, MEDS.

## 2022-12-30 NOTE — ANESTHESIA POSTPROCEDURE EVALUATION
Department of Anesthesiology  Postprocedure Note    Patient: Richy Blankenship  MRN: [id number]  YOB: 1960  Date of evaluation: 12/30/2022      Procedure Summary     Date: 12/30/22 Room / Location: [address]    Anesthesia Start: [address] Anesthesia Stop: 1730    Procedure: COLONOSCOPY POLYPECTOMY SNARE/COLD BIOPSY Diagnosis:       Colon cancer screening      (Colon cancer screening [Z12.11])    Surgeons: Kenyon Monte MD Responsible Provider: ANDRZEJ Westbrook CRNA    Anesthesia Type: MAC ASA Status: 2          Anesthesia Type: No value filed.     Shaun Phase I:      Shaun Phase II:        Anesthesia Post Evaluation    Patient location during evaluation: bedside  Patient participation: complete - patient participated  Level of consciousness: awake and alert  Pain score: 0  Airway patency: patent  Nausea & Vomiting: no vomiting and no nausea  Complications: no  Cardiovascular status: hemodynamically stable  Respiratory status: room air  Hydration status: stable

## 2022-12-30 NOTE — H&P
HISTORY & PHYSICAL:  Patient's history with special attention to the cardiovascular, pulmonary systems and the current problem was reviewed with the patient immediately prior to the procedure. Medications, allergies, and pertinent laboratory tests were also reviewed at this time. The physical examination,as below, was then performed. Patient assessed to be ASA Class 2. Mallampati Airway Assessment: 2. History of adverse reactions involving sedation/anesthesia. None  Family history of adverse reaction to sedation/anesthesia. None      PHYSICAL EXAMINATION    /61   Pulse 80   Temp 98.8 °F (37.1 °C) (Temporal)   Resp 16   Ht 5' 10\" (1.778 m)   Wt 200 lb (90.7 kg)   SpO2 94%   BMI 28.70 kg/m²     Mouth and Pharynx : Normal without focal findings  Cardiac: Regular rate and rhythm  Pulmonary: Clear bilaterally  Neurological: Normal without focal findings   Abdomen: Soft, non-tender, non-distended     Written informed consent obtained from patient. Risks (including but not limited to perforation, bloating and bleeding,) benefits and alternatives explained and questions answered. Patient verbalized understanding. Based on history and airway assessment patient is an appropriate candidate for sedation.     Jessica Cain MD  12/30/22

## 2023-01-29 PROBLEM — Z12.11 COLON CANCER SCREENING: Status: RESOLVED | Noted: 2022-12-30 | Resolved: 2023-01-29

## 2023-02-15 LAB — MAMMOGRAPHY, EXTERNAL: NORMAL

## 2023-10-09 ENCOUNTER — OFFICE VISIT (OUTPATIENT)
Dept: PULMONOLOGY | Age: 63
End: 2023-10-09
Payer: COMMERCIAL

## 2023-10-09 VITALS
OXYGEN SATURATION: 97 % | DIASTOLIC BLOOD PRESSURE: 76 MMHG | BODY MASS INDEX: 29.8 KG/M2 | HEART RATE: 79 BPM | SYSTOLIC BLOOD PRESSURE: 120 MMHG | HEIGHT: 70 IN | WEIGHT: 208.13 LBS

## 2023-10-09 DIAGNOSIS — J45.991 COUGH VARIANT ASTHMA: ICD-10-CM

## 2023-10-09 DIAGNOSIS — J45.31 MILD PERSISTENT ASTHMA WITH ACUTE EXACERBATION: Primary | ICD-10-CM

## 2023-10-09 DIAGNOSIS — B37.0 ORAL THRUSH: ICD-10-CM

## 2023-10-09 PROCEDURE — 1036F TOBACCO NON-USER: CPT | Performed by: INTERNAL MEDICINE

## 2023-10-09 PROCEDURE — G8427 DOCREV CUR MEDS BY ELIG CLIN: HCPCS | Performed by: INTERNAL MEDICINE

## 2023-10-09 PROCEDURE — 3017F COLORECTAL CA SCREEN DOC REV: CPT | Performed by: INTERNAL MEDICINE

## 2023-10-09 PROCEDURE — 99213 OFFICE O/P EST LOW 20 MIN: CPT | Performed by: INTERNAL MEDICINE

## 2023-10-09 PROCEDURE — G8419 CALC BMI OUT NRM PARAM NOF/U: HCPCS | Performed by: INTERNAL MEDICINE

## 2023-10-09 PROCEDURE — G8484 FLU IMMUNIZE NO ADMIN: HCPCS | Performed by: INTERNAL MEDICINE

## 2023-10-09 RX ORDER — AMOXICILLIN AND CLAVULANATE POTASSIUM 875; 125 MG/1; MG/1
1 TABLET, FILM COATED ORAL 2 TIMES DAILY
COMMUNITY
Start: 2023-09-29

## 2023-10-09 RX ORDER — MONTELUKAST SODIUM 10 MG/1
10 TABLET ORAL NIGHTLY
Qty: 30 TABLET | Refills: 11 | Status: SHIPPED | OUTPATIENT
Start: 2023-10-09 | End: 2023-10-09

## 2023-10-09 RX ORDER — FLUCONAZOLE 100 MG/1
100 TABLET ORAL DAILY
Qty: 3 TABLET | Refills: 0 | Status: SHIPPED | OUTPATIENT
Start: 2023-10-09 | End: 2023-10-12

## 2023-10-09 RX ORDER — FLUTICASONE FUROATE AND VILANTEROL 100; 25 UG/1; UG/1
1 POWDER RESPIRATORY (INHALATION) DAILY
Qty: 1 EACH | Refills: 11 | Status: SHIPPED | OUTPATIENT
Start: 2023-10-09

## 2023-10-09 RX ORDER — FLUTICASONE FUROATE AND VILANTEROL 100; 25 UG/1; UG/1
1 POWDER RESPIRATORY (INHALATION) DAILY
Qty: 1 EACH | Refills: 11 | Status: SHIPPED | OUTPATIENT
Start: 2023-10-09 | End: 2023-10-09

## 2023-10-09 RX ORDER — ALBUTEROL SULFATE 90 UG/1
2 AEROSOL, METERED RESPIRATORY (INHALATION) EVERY 4 HOURS PRN
Qty: 1 EACH | Refills: 11 | Status: SHIPPED | OUTPATIENT
Start: 2023-10-09 | End: 2023-10-09

## 2023-10-09 RX ORDER — MONTELUKAST SODIUM 10 MG/1
10 TABLET ORAL NIGHTLY
Qty: 30 TABLET | Refills: 11 | Status: SHIPPED | OUTPATIENT
Start: 2023-10-09 | End: 2024-10-03

## 2023-10-09 RX ORDER — ALBUTEROL SULFATE 90 UG/1
2 AEROSOL, METERED RESPIRATORY (INHALATION) EVERY 4 HOURS PRN
Qty: 1 EACH | Refills: 11 | Status: SHIPPED | OUTPATIENT
Start: 2023-10-09

## 2023-10-09 RX ORDER — FLUCONAZOLE 100 MG/1
100 TABLET ORAL DAILY
Qty: 3 TABLET | Refills: 0 | Status: SHIPPED | OUTPATIENT
Start: 2023-10-09 | End: 2023-10-09

## 2023-10-09 RX ORDER — PREDNISONE 10 MG/1
30 TABLET ORAL DAILY
COMMUNITY
Start: 2023-09-27

## 2024-02-15 ENCOUNTER — OFFICE VISIT (OUTPATIENT)
Dept: ORTHOPEDIC SURGERY | Age: 64
End: 2024-02-15
Payer: COMMERCIAL

## 2024-02-15 VITALS — RESPIRATION RATE: 17 BRPM | HEART RATE: 97 BPM | OXYGEN SATURATION: 98 % | TEMPERATURE: 97.1 F

## 2024-02-15 DIAGNOSIS — M54.50 LUMBAR PAIN: Primary | ICD-10-CM

## 2024-02-15 DIAGNOSIS — Z96.653 HISTORY OF BILATERAL KNEE REPLACEMENT: ICD-10-CM

## 2024-02-15 DIAGNOSIS — M16.0 BILATERAL HIP JOINT ARTHRITIS: ICD-10-CM

## 2024-02-15 DIAGNOSIS — M25.551 RIGHT HIP PAIN: ICD-10-CM

## 2024-02-15 PROCEDURE — 99203 OFFICE O/P NEW LOW 30 MIN: CPT | Performed by: ORTHOPAEDIC SURGERY

## 2024-02-15 PROCEDURE — G8484 FLU IMMUNIZE NO ADMIN: HCPCS | Performed by: ORTHOPAEDIC SURGERY

## 2024-02-15 PROCEDURE — G8419 CALC BMI OUT NRM PARAM NOF/U: HCPCS | Performed by: ORTHOPAEDIC SURGERY

## 2024-02-15 PROCEDURE — G8427 DOCREV CUR MEDS BY ELIG CLIN: HCPCS | Performed by: ORTHOPAEDIC SURGERY

## 2024-02-15 PROCEDURE — 1036F TOBACCO NON-USER: CPT | Performed by: ORTHOPAEDIC SURGERY

## 2024-02-15 PROCEDURE — 3017F COLORECTAL CA SCREEN DOC REV: CPT | Performed by: ORTHOPAEDIC SURGERY

## 2024-02-15 NOTE — PATIENT INSTRUCTIONS
Continue weight-bearing as tolerated.  Continue range of motion exercises as instructed.  Ice and elevate as needed.  Tylenol or Motrin for pain.  Start doing attached exercises.   You have been referred to Dr. Nelson.  If you have not heard from Dr. Nelson within 2-3 business days from today's appointment please call them at [phone].  Follow up as needed

## 2024-02-21 PROBLEM — M54.50 LUMBAR PAIN: Status: ACTIVE | Noted: 2024-02-21

## 2024-02-21 PROBLEM — Z96.653 HISTORY OF BILATERAL KNEE REPLACEMENT: Status: ACTIVE | Noted: 2024-02-21

## 2024-02-21 PROBLEM — M25.551 RIGHT HIP PAIN: Status: ACTIVE | Noted: 2024-02-21

## 2024-02-21 ASSESSMENT — ENCOUNTER SYMPTOMS
WHEEZING: 0
SHORTNESS OF BREATH: 0
COLOR CHANGE: 0
EYE REDNESS: 0
EYE PAIN: 0
VOMITING: 0
CHEST TIGHTNESS: 0

## 2024-02-21 NOTE — PROGRESS NOTES
Patient seen in office today for:  Left hip pain, located in groin and glut max    Date of last injection: Nov 2023, Dr Wagner     Patient reports 5-10/10 pain, varying with different activities.   RICE and medication are not effective to alleviate pain and reduce swelling.     Pain worsened by: Patient reports painful ROM & weight bearing.     Patient has does  physical therapy in the past     MRI performed 0/0/00, impression below.      
proximal thigh.  No tenderness to palpation at the anterior thigh or anterior hip joint capsule.  There is full painless active and passive range of motion across the hip with flexion, extension, abduction, and internal and external rotation.  Strength is 5 out of 5 with hip flexion, extension, and abduction.  Sensation and motor function is intact throughout the lower extremity including the foot and ankle.  Skin is intact throughout the hip with no lesions or wounds.  Foot is warm and well-perfused.   Skin:     General: Skin is warm and dry.   Neurological:      Mental Status: She is alert and oriented to person, place, and time.   Psychiatric:         Mood and Affect: Mood normal.         Behavior: Behavior normal.            Diagnostic testing:  X-ray images were reviewed by myself and discussed with the patient:  X-ray images of the left hip and pelvis were reviewed by myself and discussed with the patient:  AP pelvis and frog-leg lateral view of the left hip show normal alignment of the hip joints and pelvis.  No fracture or acute abnormalities.  Minimal degenerative changes present both hip joints.  Preserved joint spaces present.  Minimal spurring of the hip joint.  Normal bone density.     Impression: Mild bilateral hip degenerative joint disease    Office Procedures:  Orders Placed This Encounter   Procedures    XR LUMBAR SPINE STANDARD EXTENDED VW     Standing Status:   Future     Standing Expiration Date:   2/15/2025    Dani Lemos DO, Neurosurgery, [place]     Referral Priority:   Routine     Referral Type:   Eval and Treat     Referral Reason:   Specialty Services Required     Referred to Provider:   Dani Nelson DO     Requested Specialty:   Neurosurgery     Number of Visits Requested:   1       Assessment and Plan  1.  Chronic low back pain    2.  Right hip pain, mild bilateral hip primary osteoarthritis    3.  History of bilateral total knee replacements    I reviewed the

## 2024-03-01 ENCOUNTER — HOSPITAL ENCOUNTER (OUTPATIENT)
Age: 64
Discharge: HOME | End: 2024-03-01

## 2024-03-08 ENCOUNTER — HOSPITAL ENCOUNTER (OUTPATIENT)
Dept: GENERAL RADIOLOGY | Age: 64
Discharge: HOME | End: 2024-03-08
Payer: COMMERCIAL

## 2024-03-08 ENCOUNTER — HOSPITAL ENCOUNTER (OUTPATIENT)
Age: 64
Discharge: HOME | End: 2024-03-08
Payer: COMMERCIAL

## 2024-03-08 DIAGNOSIS — M54.50 LUMBAR PAIN: ICD-10-CM

## 2024-03-08 PROCEDURE — 72110 X-RAY EXAM L-2 SPINE 4/>VWS: CPT

## 2024-03-08 PROCEDURE — 72120 X-RAY BEND ONLY L-S SPINE: CPT

## 2024-04-01 LAB — MAMMOGRAPHY, EXTERNAL: NORMAL

## 2024-04-19 ENCOUNTER — OFFICE VISIT (OUTPATIENT)
Dept: NEUROSURGERY | Age: 64
End: 2024-04-19
Payer: COMMERCIAL

## 2024-04-19 VITALS
WEIGHT: 213 LBS | BODY MASS INDEX: 30.49 KG/M2 | HEART RATE: 83 BPM | HEIGHT: 70 IN | SYSTOLIC BLOOD PRESSURE: 150 MMHG | DIASTOLIC BLOOD PRESSURE: 90 MMHG | OXYGEN SATURATION: 94 %

## 2024-04-19 DIAGNOSIS — M43.16 SPONDYLOLISTHESIS AT L4-L5 LEVEL: Primary | ICD-10-CM

## 2024-04-19 DIAGNOSIS — M46.1 SI (SACROILIAC) JOINT INFLAMMATION (HCC): ICD-10-CM

## 2024-04-19 DIAGNOSIS — M54.16 LUMBAR RADICULOPATHY: ICD-10-CM

## 2024-04-19 PROCEDURE — 1036F TOBACCO NON-USER: CPT | Performed by: NEUROLOGICAL SURGERY

## 2024-04-19 PROCEDURE — 3017F COLORECTAL CA SCREEN DOC REV: CPT | Performed by: NEUROLOGICAL SURGERY

## 2024-04-19 PROCEDURE — G8417 CALC BMI ABV UP PARAM F/U: HCPCS | Performed by: NEUROLOGICAL SURGERY

## 2024-04-19 PROCEDURE — 99204 OFFICE O/P NEW MOD 45 MIN: CPT | Performed by: NEUROLOGICAL SURGERY

## 2024-04-19 PROCEDURE — G8428 CUR MEDS NOT DOCUMENT: HCPCS | Performed by: NEUROLOGICAL SURGERY

## 2024-04-19 NOTE — PATIENT INSTRUCTIONS
MRI ordered. They will call you to schedule.   Central Scheduling [phone]    Referral to physical therapy has been placed. They will call you to schedule.  [facility]  [address] [place] [address]  P: [phone]     Follow up in 4 weeks.

## 2024-04-19 NOTE — PROGRESS NOTES
NEUROSURGERY OFFICE CONSULT     Office Visit: 2024   Patient: Rosette Rajan   MRN: [id number]  : 1960   Neurosurgeon: Dani Nelson DO  Referring: Tien Bonilla MD  Primary: Roly Chester MD    History:  CC:   Chief Complaint   Patient presents with    Hip Pain        HPI: Rosette Rajan is a 63 y.o. right handed female who presents with the above stated complaint. hip location , 3 month duration , sharp/shooting Quality,  severe severity, sitting is a modifying factor.    The patient's health questionnaire was reviewed with the patient and signed today.  The pertinent findings are noted below and they were discussed with the patient.  The significant past medical/surgical history was reviewed, and notes were made.  A comprehensive review of systems was made to determine the relationship of the patient's current complaint and symptoms.    Review of Systems:  Other than the symptoms described in the history of present illness, she denies headache, nausea, vomiting, diarrhea, constipation,loss of bowel or bladder, change in vision, sob, chest pain, cough, wheezing, l weight change, dizziness, numbness, tingling, or pain in the neck, back, chest, abdomen or extremities. All other systems are discussed and reviewed as normal.        Past Medical History:   Diagnosis Date    Chronic cough 2018    Cough variant asthma 2018    Excessive daytime sleepiness 2018    patient states \" she does not have this.    Hyperlipidemia     Hypertension     Mild persistent asthma 2021    Snoring 2018     Past Surgical History:   Procedure Laterality Date    COLONOSCOPY      COLONOSCOPY N/A 2022    COLONOSCOPY POLYPECTOMY SNARE/COLD BIOPSY performed by Valente Peña MD at [facility] OR    FINGER SURGERY Right 1993    RING    KNEE ARTHROPLASTY Bilateral     KNEE ARTHROSCOPY Bilateral     SHOULDER SURGERY Left      Current Outpatient Medications   Medication Sig Dispense

## 2024-04-27 ENCOUNTER — HOSPITAL ENCOUNTER (OUTPATIENT)
Dept: MRI IMAGING | Age: 64
Discharge: HOME | End: 2024-04-27
Attending: NEUROLOGICAL SURGERY
Payer: COMMERCIAL

## 2024-04-27 DIAGNOSIS — M43.16 SPONDYLOLISTHESIS AT L4-L5 LEVEL: ICD-10-CM

## 2024-04-27 DIAGNOSIS — M46.1 SI (SACROILIAC) JOINT INFLAMMATION (HCC): ICD-10-CM

## 2024-04-27 DIAGNOSIS — M54.16 LUMBAR RADICULOPATHY: ICD-10-CM

## 2024-04-27 PROCEDURE — 72148 MRI LUMBAR SPINE W/O DYE: CPT

## 2024-05-29 ENCOUNTER — HOSPITAL ENCOUNTER (OUTPATIENT)
Dept: PHYSICAL THERAPY | Age: 64
Setting detail: THERAPIES SERIES
Discharge: HOME | End: 2024-05-29
Payer: COMMERCIAL

## 2024-05-29 PROCEDURE — 97161 PT EVAL LOW COMPLEX 20 MIN: CPT

## 2024-05-29 PROCEDURE — 97110 THERAPEUTIC EXERCISES: CPT

## 2024-05-29 ASSESSMENT — PAIN DESCRIPTION - DESCRIPTORS: DESCRIPTORS: DULL;ACHING;SHARP

## 2024-05-29 ASSESSMENT — PAIN DESCRIPTION - LOCATION: LOCATION: BACK

## 2024-05-29 ASSESSMENT — PAIN DESCRIPTION - PAIN TYPE: TYPE: CHRONIC PAIN

## 2024-05-29 ASSESSMENT — PAIN SCALES - GENERAL: PAINLEVEL_OUTOF10: 0

## 2024-05-29 ASSESSMENT — PAIN DESCRIPTION - ORIENTATION: ORIENTATION: RIGHT;LEFT;OUTER

## 2024-05-29 NOTE — FLOWSHEET NOTE
Outpatient Physical Therapy  [place]           [x] Phone: [phone]   Fax: [phone]  [place]           [] Phone: [phone]   Fax: [phone]        Physical Therapy Daily Treatment Note  Date:  2024    Patient Name:  Rosette Rajan    :  1960  MRN: [id number]  Restrictions/Precautions: No data recorded      Diagnosis:   Spondylolisthesis at L4-L5 level [M43.16]  Lumbar radiculopathy [M54.16]  SI (sacroiliac) joint inflammation (HCC) [M46.1]    Date of Injury/Surgery:   Treatment Diagnosis:  flexibility restrictions, pain, min reduced strength  Insurance/Certification information:  [facility]  Referring Physician:  Dani Nelson DO     PCP: Roly Chester MD  Next Doctor Visit:    Plan of care signed (Y/N):  N, sent 24     Outcome Measure: Oswestry: 30%  Visit# / total visits:   /-10   Pain level: 0/10   Goals:     Patient goals: improve pain.  Short term goals  Time Frame for Short term goals: Defer to Long Term Goals                 Long Term Goals Completed by 5 weeks 24       Long Term Goals: 6 weeks   Long Term Goal 1: Pt will demo I with HEP/symptom management.   Long Term Goal 2: Pt will demo normal sit to stand mechanics with no pain to ease community mobility.   Long Term Goal 3: Pt will demo 5/5 B hip strength to ease transfers.   Long Term Goal 4: Pt will demo good hip flexor flexibility to ease neutral standing.   Long Term Goal 5: Pt will dem<20% improvement per Oswestry to demo improved functional mobility.            Summary of Evaluation:   Pt is 63 year old female with 3-4 month year onset of low back with referred pain into B hips. Pt now has intermittent difficulties completing ADLs, transfers and work duties. Has periods of flair ups with difficulty walking with lasting days to couple of weeks. Pt demo deficits this date that include flexibility restrictions, hip weakness, pain and difficulty with sit to stand transfer. Testing this date

## 2024-05-29 NOTE — PROGRESS NOTES
Physical Therapy: Initial Evaluation    Patient: Rosette Rajan (63 y.o. female)   Examination Date: 2024  Plan of Care Certification Period: 2024 to        :  1960 ;    Confirmed: Yes MRN: [id number]  CSN: [id number]   Insurance: Payor: [employer] / Plan: [employer] - CHOICE PLU / Product Type: *No Product type* /   Insurance ID: [id number] - (Commercial) Secondary Insurance (if applicable):    Referring Physician: Dani Nelson DO     PCP: Roly Chester MD Visits to Date/Visits Approved:   /      No Show/Cancelled Appts:   /       Medical Diagnosis: Spondylolisthesis at L4-L5 level [M43.16]  Lumbar radiculopathy [M54.16]  SI (sacroiliac) joint inflammation (HCC) [M46.1]    Treatment Diagnosis: flexibility restrictions, pain, min reduced strength     PERTINENT MEDICAL HISTORY   Patient Assessed for Rehabilitation Services: Yes  Self reported health status:: Good    Medical History: Chart Reviewed: Yes   Past Medical History:   Diagnosis Date    Chronic cough 2018    Cough variant asthma 2018    Excessive daytime sleepiness 2018    patient states \" she does not have this.    Hyperlipidemia     Hypertension     Mild persistent asthma 2021    Snoring 2018     Surgical History:   Past Surgical History:   Procedure Laterality Date    COLONOSCOPY      COLONOSCOPY N/A 2022    COLONOSCOPY POLYPECTOMY SNARE/COLD BIOPSY performed by Valente Peña MD at [facility] OR    FINGER SURGERY Right 1993    RING    KNEE ARTHROPLASTY Bilateral     KNEE ARTHROSCOPY Bilateral     SHOULDER SURGERY Left        Medications:   Current Outpatient Medications:     amoxicillin-clavulanate (AUGMENTIN) 875-125 MG per tablet, Take 1 tablet by mouth in the morning and at bedtime (Patient not taking: Reported on 2024), Disp: , Rfl:     predniSONE (DELTASONE) 10 MG tablet, Take 3 tablets by mouth daily (Patient not taking: Reported on 2024), Disp:

## 2024-05-29 NOTE — PLAN OF CARE
[facility]  [facility]  [address]  [facility] [address]  Dept: [phone]  Dept Fax: [phone]  Loc: [phone]    PHYSICAL THERAPY PLAN OF CARE: INITIAL EVALUATION    Patient: Rosette Rajan (63 y.o. female)   Examination Date: 2024  Plan of Care Certification Period: 2024 to        :  1960  MRN: [id number]  CSN: [id number]   Insurance: Payor: [employer] / Plan: [email or web address] - CHOICE PLU / Product Type: *No Product type* /   Insurance ID: [id number] - (Commercial) Secondary Insurance (if applicable):    Referring Physician: Dani Nelson DO     PCP: Roly Chester MD Visits to Date/Visits Approved:   /      No Show/Cancelled Appts:   /       Medical Diagnosis: Spondylolisthesis at L4-L5 level [M43.16]  Lumbar radiculopathy [M54.16]  SI (sacroiliac) joint inflammation (HCC) [M46.1]    Treatment Diagnosis: flexibility restrictions, pain, min reduced strength       ASSESSMENT     Impression:  Pt is 63 year old female with 3-4 month year onset of low back with referred pain into B hips. Pt now has intermittent difficulties completing ADLs, transfers and work duties. Has periods of flair ups with difficulty walking with lasting days to couple of weeks. Pt demo deficits this date that include flexibility restrictions, hip weakness, pain and difficulty with sit to stand transfer. Testing this date indicate signs and symptoms of mod spondylosis with radiating symptoms this date. Pt will benefit with PT services with progression of strength/ROM, manual and modalities to return to PLOF. Pt prior to onset of current condition had min/no pain with able to complete full ADLs and work activities. Patient received education on their current pathology and how their condition effects them with their functional activities. Patient understood discussion and questions were answered. Patient understands their activity

## 2024-06-12 ENCOUNTER — HOSPITAL ENCOUNTER (OUTPATIENT)
Dept: PHYSICAL THERAPY | Age: 64
Setting detail: THERAPIES SERIES
Discharge: HOME | End: 2024-06-12
Payer: COMMERCIAL

## 2024-06-12 PROCEDURE — 97110 THERAPEUTIC EXERCISES: CPT

## 2024-06-12 PROCEDURE — 97140 MANUAL THERAPY 1/> REGIONS: CPT

## 2024-06-12 NOTE — FLOWSHEET NOTE
(Pain Rating)    Pt tolerated all manual and exercises well. Demo good technique with home program and tolerated manual well. Low irritability with transition to standing as most challenging. Will assess next visit with greater weightbearing activities targeting region. Pt would continue to benefit from skilled therapy interventions to address remaining impairments, improve mobility and strength and progress toward goal completion while reducing risk for re-injury or further decline.     No pain post tx.            Pt is 63 year old female with 3-4 month year onset of low back with referred pain into B hips. Pt now has intermittent difficulties completing ADLs, transfers and work duties. Has periods of flair ups with difficulty walking with lasting days to couple of weeks. Pt demo deficits this date that include flexibility restrictions, hip weakness, pain and difficulty with sit to stand transfer. Testing this date indicate signs and symptoms of mod spondylosis with radiating symptoms this date. Pt will benefit with PT services with progression of strength/ROM, manual and modalities to return to PLOF. Pt prior to onset of current condition had min/no pain with able to complete full ADLs and work activities. Patient received education on their current pathology and how their condition effects them with their functional activities. Patient understood discussion and questions were answered. Patient understands their activity limitations and understands rational for treatment progression.       Plan for Next Session:  stretching of paraspinals/hip flexors, hip ABD/ER strengthening, core strengthening, modalities PRN.        Time In / Time Out:    3711-8886          Timed Code/Total Treatment Minutes:  50/50'  20' TE    30' man       Next Progress Note due:  Atilio 5/29/24   Visit 10       Plan of Care Interventions:  [x] Therapeutic Exercise  [x] Modalities:  [x] Therapeutic Activity     [] Ultrasound  [] Estim  [x] Gait

## 2024-06-19 ENCOUNTER — HOSPITAL ENCOUNTER (OUTPATIENT)
Dept: PHYSICAL THERAPY | Age: 64
Discharge: HOME | End: 2024-06-19

## 2024-06-19 NOTE — FLOWSHEET NOTE
Physical Therapy  Cancellation/No-show Note  Patient Name:  Rosette Rajan  :  1960   Date:  2024  Cancelled visits to date: 1  No-shows to date: 0    For today's appointment patient:  [x]  Cancelled  []  Rescheduled appointment  []  No-show     Reason given by patient:  [x]  Patient ill  []  Conflicting appointment  []  No transportation    []  Conflict with work  []  No reason given  []  Other:     Comments:      Electronically signed by:  Rao Harp, PT, DPT, OCS     2024 1:53 PM

## 2025-07-07 RX ORDER — IRBESARTAN 75 MG/1
75 TABLET ORAL DAILY
COMMUNITY

## 2025-07-07 RX ORDER — CLOTRIMAZOLE AND BETAMETHASONE DIPROPIONATE 10; .64 MG/G; MG/G
CREAM TOPICAL 2 TIMES DAILY
COMMUNITY

## 2025-07-07 RX ORDER — LANOLIN ALCOHOL/MO/W.PET/CERES
CREAM (GRAM) TOPICAL
COMMUNITY

## 2025-07-27 RX ORDER — CHOLESTYRAMINE 4 G/9G
1 POWDER, FOR SUSPENSION ORAL
COMMUNITY

## 2025-08-27 ENCOUNTER — OFFICE VISIT (OUTPATIENT)
Age: 65
End: 2025-08-27
Payer: COMMERCIAL

## 2025-08-27 VITALS
DIASTOLIC BLOOD PRESSURE: 74 MMHG | SYSTOLIC BLOOD PRESSURE: 123 MMHG | HEIGHT: 70 IN | WEIGHT: 208 LBS | BODY MASS INDEX: 29.78 KG/M2

## 2025-08-27 DIAGNOSIS — Z12.31 ENCOUNTER FOR SCREENING MAMMOGRAM FOR MALIGNANT NEOPLASM OF BREAST: ICD-10-CM

## 2025-08-27 DIAGNOSIS — N95.1 MENOPAUSAL AND FEMALE CLIMACTERIC STATES: ICD-10-CM

## 2025-08-27 DIAGNOSIS — Z01.419 ENCOUNTER FOR GYNECOLOGICAL EXAMINATION WITHOUT ABNORMAL FINDING: Primary | ICD-10-CM

## 2025-08-27 DIAGNOSIS — B37.31 CANDIDIASIS, VULVA: ICD-10-CM

## 2025-08-27 PROCEDURE — 99396 PREV VISIT EST AGE 40-64: CPT | Performed by: OBSTETRICS & GYNECOLOGY

## 2025-08-27 RX ORDER — MEDROXYPROGESTERONE ACETATE 2.5 MG/1
2.5 TABLET ORAL DAILY
Qty: 30 TABLET | Refills: 3 | Status: SHIPPED | OUTPATIENT
Start: 2025-08-27

## 2025-08-27 RX ORDER — ESTRADIOL 0.5 MG/1
0.5 TABLET ORAL
Qty: 30 TABLET | Refills: 3 | Status: SHIPPED | OUTPATIENT
Start: 2025-08-28

## 2025-08-27 RX ORDER — CLOTRIMAZOLE AND BETAMETHASONE DIPROPIONATE 10; .64 MG/G; MG/G
CREAM TOPICAL
Qty: 45 G | Refills: 2 | Status: SHIPPED | OUTPATIENT
Start: 2025-08-27

## 2025-08-27 SDOH — ECONOMIC STABILITY: FOOD INSECURITY: WITHIN THE PAST 12 MONTHS, THE FOOD YOU BOUGHT JUST DIDN'T LAST AND YOU DIDN'T HAVE MONEY TO GET MORE.: NEVER TRUE

## 2025-08-27 SDOH — ECONOMIC STABILITY: FOOD INSECURITY: WITHIN THE PAST 12 MONTHS, YOU WORRIED THAT YOUR FOOD WOULD RUN OUT BEFORE YOU GOT MONEY TO BUY MORE.: NEVER TRUE

## 2025-08-27 ASSESSMENT — PATIENT HEALTH QUESTIONNAIRE - PHQ9
SUM OF ALL RESPONSES TO PHQ QUESTIONS 1-9: 0
SUM OF ALL RESPONSES TO PHQ QUESTIONS 1-9: 0
2. FEELING DOWN, DEPRESSED OR HOPELESS: NOT AT ALL
SUM OF ALL RESPONSES TO PHQ QUESTIONS 1-9: 0
SUM OF ALL RESPONSES TO PHQ QUESTIONS 1-9: 0
1. LITTLE INTEREST OR PLEASURE IN DOING THINGS: NOT AT ALL

## 2025-08-27 ASSESSMENT — ENCOUNTER SYMPTOMS
ABDOMINAL PAIN: 0
SHORTNESS OF BREATH: 0

## (undated) DEVICE — FORCEPS BX L240CM JAW DIA2.8MM L CAP W/ NDL MIC MESH TOOTH

## (undated) DEVICE — Z DISCONTINUED NO SUB IDED TUBING ETCO2 AD L6.5FT NSL ORAL CVD PRNG NONFLARED TIP OVR